# Patient Record
Sex: MALE | Race: WHITE | NOT HISPANIC OR LATINO | URBAN - METROPOLITAN AREA
[De-identification: names, ages, dates, MRNs, and addresses within clinical notes are randomized per-mention and may not be internally consistent; named-entity substitution may affect disease eponyms.]

---

## 2017-05-17 ENCOUNTER — IMPORTED ENCOUNTER (OUTPATIENT)
Dept: URBAN - METROPOLITAN AREA CLINIC 50 | Facility: CLINIC | Age: 82
End: 2017-05-17

## 2017-05-17 NOTE — PATIENT DISCUSSION
"""Dr. Iker Fontanez to review test results with patient."" ""Reassured patient that intraocular pressures (IOPs) are at target levels and other ocular findings are stable. Continue present management and/or medication(s). Follow up as directed. "" ""PCO(s) are not visually significant for capsulotomy.  Monitor by regular examinations. """

## 2017-11-15 ENCOUNTER — IMPORTED ENCOUNTER (OUTPATIENT)
Dept: URBAN - METROPOLITAN AREA CLINIC 50 | Facility: CLINIC | Age: 82
End: 2017-11-15

## 2017-11-15 NOTE — PATIENT DISCUSSION
"""Reassured patient that intraocular pressures (IOPs) are at target levels and other ocular findings are stable. Continue present management and/or medication(s). Follow up as directed. "" ""Continue Dorzolamide-Timolol left eye twice a day.  """

## 2018-03-14 ENCOUNTER — IMPORTED ENCOUNTER (OUTPATIENT)
Dept: URBAN - METROPOLITAN AREA CLINIC 50 | Facility: CLINIC | Age: 83
End: 2018-03-14

## 2018-04-09 ENCOUNTER — IMPORTED ENCOUNTER (OUTPATIENT)
Dept: URBAN - METROPOLITAN AREA CLINIC 50 | Facility: CLINIC | Age: 83
End: 2018-04-09

## 2018-11-07 ENCOUNTER — IMPORTED ENCOUNTER (OUTPATIENT)
Dept: URBAN - METROPOLITAN AREA CLINIC 50 | Facility: CLINIC | Age: 83
End: 2018-11-07

## 2018-11-08 ENCOUNTER — IMPORTED ENCOUNTER (OUTPATIENT)
Dept: URBAN - METROPOLITAN AREA CLINIC 50 | Facility: CLINIC | Age: 83
End: 2018-11-08

## 2018-11-15 ENCOUNTER — IMPORTED ENCOUNTER (OUTPATIENT)
Dept: URBAN - METROPOLITAN AREA CLINIC 50 | Facility: CLINIC | Age: 83
End: 2018-11-15

## 2019-05-15 ENCOUNTER — IMPORTED ENCOUNTER (OUTPATIENT)
Dept: URBAN - METROPOLITAN AREA CLINIC 50 | Facility: CLINIC | Age: 84
End: 2019-05-15

## 2019-05-15 NOTE — PATIENT DISCUSSION
"""Dr. Janie Blanca to review test results with patient at todays appoinment. "" ""Reassured patient that intraocular pressures (IOPs) are at target levels and other ocular findings are stable. Continue present management and/or medication(s).  Follow up as directed. """

## 2019-11-20 ENCOUNTER — IMPORTED ENCOUNTER (OUTPATIENT)
Dept: URBAN - METROPOLITAN AREA CLINIC 50 | Facility: CLINIC | Age: 84
End: 2019-11-20

## 2020-07-02 ENCOUNTER — IMPORTED ENCOUNTER (OUTPATIENT)
Dept: URBAN - METROPOLITAN AREA CLINIC 50 | Facility: CLINIC | Age: 85
End: 2020-07-02

## 2020-07-29 ENCOUNTER — IMPORTED ENCOUNTER (OUTPATIENT)
Dept: URBAN - METROPOLITAN AREA CLINIC 50 | Facility: CLINIC | Age: 85
End: 2020-07-29

## 2020-07-29 NOTE — PATIENT DISCUSSION
"""iop borderline. 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  ""

## 2021-04-17 ASSESSMENT — PACHYMETRY
OS_CT_UM: 592
OD_CT_UM: 571
OS_CT_UM: 592
OD_CT_UM: 571
OS_CT_UM: 592
OS_CT_UM: 592
OD_CT_UM: 571
OS_CT_UM: 592
OS_CT_UM: 592
OD_CT_UM: 571
OD_CT_UM: 571
OS_CT_UM: 592
OD_CT_UM: 571
OS_CT_UM: 592
OD_CT_UM: 571
OS_CT_UM: 592
OD_CT_UM: 571
OS_CT_UM: 592
OS_CT_UM: 592
OD_CT_UM: 571

## 2021-04-17 ASSESSMENT — TONOMETRY
OD_IOP_MMHG: 19
OD_IOP_MMHG: 25
OS_IOP_MMHG: 23
OD_IOP_MMHG: 21
OD_IOP_MMHG: 16
OD_IOP_MMHG: 24
OS_IOP_MMHG: 13
OD_IOP_MMHG: 20
OD_IOP_MMHG: 25
OD_IOP_MMHG: 20
OS_IOP_MMHG: 16
OD_IOP_MMHG: 18
OD_IOP_MMHG: 16
OS_IOP_MMHG: 18
OD_IOP_MMHG: 17
OD_IOP_MMHG: 26
OS_IOP_MMHG: 18
OD_IOP_MMHG: 15
OS_IOP_MMHG: 17
OS_IOP_MMHG: 20
OD_IOP_MMHG: 24
OD_IOP_MMHG: 19
OS_IOP_MMHG: 24
OS_IOP_MMHG: 18
OD_IOP_MMHG: 22
OS_IOP_MMHG: 26
OD_IOP_MMHG: 17
OD_IOP_MMHG: 18
OS_IOP_MMHG: 20
OS_IOP_MMHG: 20
OS_IOP_MMHG: 22
OS_IOP_MMHG: 21
OS_IOP_MMHG: 16
OS_IOP_MMHG: 24
OD_IOP_MMHG: 23
OS_IOP_MMHG: 18
OD_IOP_MMHG: 17
OS_IOP_MMHG: 18
OS_IOP_MMHG: 11
OS_IOP_MMHG: 15

## 2021-04-17 ASSESSMENT — VISUAL ACUITY
OD_SC: 20/25
OD_SC: 20/25+
OS_SC: 20/60
OD_SC: 20/30+1
OS_PH: 20/60
OS_PH: 20/50
OD_SC: 20/30-+
OS_SC: 20/70
OS_SC: 20/50+
OS_SC: 20/60
OD_SC: 20/25
OD_BAT: <400
OS_BAT: <400
OS_OTHER: <400.
OD_OTHER: <400.
OS_SC: 20/60
OS_PH: 20/40
OD_BAT: 20/40
OS_PH: 20/50
OD_SC: 20/30
OS_PH: 20/40+1
OS_SC: 20/40
OS_SC: 20/70
OS_SC: 20/60
OD_OTHER: 20/40.
OS_PH: 20/40-
OD_SC: 20/25
OS_SC: 20/50-1
OD_SC: 20/30
OD_SC: 20/20-